# Patient Record
Sex: FEMALE | Race: NATIVE HAWAIIAN OR OTHER PACIFIC ISLANDER | Employment: FULL TIME | URBAN - METROPOLITAN AREA
[De-identification: names, ages, dates, MRNs, and addresses within clinical notes are randomized per-mention and may not be internally consistent; named-entity substitution may affect disease eponyms.]

---

## 2021-07-23 ENCOUNTER — OFFICE VISIT (OUTPATIENT)
Dept: SURGERY | Age: 26
End: 2021-07-23
Payer: OTHER GOVERNMENT

## 2021-07-23 VITALS
HEART RATE: 94 BPM | OXYGEN SATURATION: 100 % | WEIGHT: 236 LBS | RESPIRATION RATE: 18 BRPM | DIASTOLIC BLOOD PRESSURE: 90 MMHG | TEMPERATURE: 98.2 F | SYSTOLIC BLOOD PRESSURE: 136 MMHG | HEIGHT: 62 IN | BODY MASS INDEX: 43.43 KG/M2

## 2021-07-23 DIAGNOSIS — E66.01 MORBID OBESITY WITH BMI OF 40.0-44.9, ADULT (HCC): Primary | ICD-10-CM

## 2021-07-23 PROCEDURE — 99205 OFFICE O/P NEW HI 60 MIN: CPT | Performed by: SURGERY

## 2021-07-23 NOTE — PROGRESS NOTES
Chief Complaint   Patient presents with    Advice Only     confirmed video     Pt ID confirmed    Weight Loss Metrics 7/23/2021 7/23/2021 4/14/2021 1/11/2019 12/18/2018 12/17/2018 12/7/2018   Pre op / Initial Wt 236 - - - - - -   Today's Wt - 236 lb 243 lb 217 lb 217 lb 215 lb 9.6 oz 209 lb   BMI - 43.16 kg/m2 43.05 kg/m2 38.44 kg/m2 38.44 kg/m2 38.19 kg/m2 37.02 kg/m2   Ideal Body Wt 125 - - - - - -   Excess Body Wt 111 - - - - - -   Goal Wt 147 - - - - - -   Wt loss to date 0 - - - - - -   % Wt Loss 0 - - - - - -   80% EBW 88.8 - - - - - -       Body mass index is 43.16 kg/m².     Ms. Maria Isabel Daniel has been given the following recommendations today due to her elevated BP reading: pt will discuss blood pressure readings with pcp

## 2021-07-23 NOTE — PROGRESS NOTES
Consult    Patient: Dima Hendrix MRN: [id number]  SSN: [id number]    YOB: 1995  Age: 27 y.o. Sex: female      Initial  Consultation for Bariatric Surgery     Dima Hendrix is a 68-year-old white female who presents for discussion of surgical options available for definitive management of her clinically severe obesity  Onset obesity: Childhood  Weight at age 23: 250 pounds on a 5 foot 2 inch frame  Maximum weight: 250 pounds at the age of 23  Pattern/progression of weight gain: Slowly progressive interrupted by dietary weight loss followed by regain of lost weight as well as additional weight thus exhibiting yoyo effect after maximum weight of 250 pounds which occurred at the age of 23  Max medical weight loss attempts: Multiple unsupervised and supervised weight loss trials with a maximal loss occurring in 2017 losing 50 pounds over 6 months  Comorbidities: Hypertension, hypercholesterolemia, stress urinary incontinence, clinical obstructive sleep apnea and weight related arthropathy-knees  Current weight: 236 pounds on 5 foot 2 inch frame with a body mass index of 43  At the body weight: 125  Excess body weight: 111  Estimated postsurgical weight loss based on 8% loss of excess body weight: 89  Postsurgical goal weight: 47  Allergies: Amoxicillin, penicillin  Current medications: See medication list  Past medical history:  1. Clinically severe obesity with body mass index of 43 with obesity related comorbidities of hypertension, hypercholesterolemia, stress urinary incontinence, clinical obstructive sleep apnea and weight related arthropathy-knees  2. Anxiety  3. HPV  4. History of bipolar disease  Past surgical history:  1. Tonsillectomy-childhood  2. [place] tooth extractions 2017  3. Ureteral stents 2019  Social history: Denies utilization for tobacco and alcohol  Family history:   Mother  52 status post myocardial infarction, SLE, RA, hypothyroidism, hypercholesterolemia  Father  30s-unknown  etiology  No siblings    Allergies   Allergen Reactions    Amoxicillin Rash    Penicillins Rash       No current outpatient medications on file prior to visit. No current facility-administered medications on file prior to visit. Past Medical History:   Diagnosis Date    HTN (hypertension)     Hydronephrosis, right 2018    First trimester obstetrical imaging incidentally found a right ureterocele and dilation of distal right ureter.  Maternal obesity syndrome in first trimester     Pregnant     Ureterocele 2018    First trimester obstetrical imaging incidentally found a right ureterocele and dilation of distal right ureter. Past Surgical History:   Procedure Laterality Date    HX TONSIL AND ADENOIDECTOMY      HX UROLOGICAL      stent placed in ureter       Social History     Tobacco Use    Smoking status: Never Smoker    Smokeless tobacco: Never Used   Substance Use Topics    Alcohol use: Yes     Comment: occasional 1 or 2 a month    Drug use: No       Family History   Problem Relation Age of Onset    Diabetes Maternal Grandmother     Diabetes Paternal Grandmother     Cancer Maternal Grandfather     Heart Disease Mother     Hypertension Mother     Lupus Mother     Thyroid Disease Mother     Kidney Disease Mother          Review of Systems:      General: Denies fevers, chills, night sweats, fatigue, weight loss, or weight gain.     HEENT: Denies changes in auditory or visual acuity, recurrent pharyngitis, epistaxis, chronic rhinorrhea, vertigo    Respiratory: Denies increasing shortness of breath, productive cough, hemoptysis    Cardiac: Denies known history of cardiac disease, heart murmur, palpitations    GI: Denies dysphagia, recurrent emesis, hematemesis, changes in bowel habits, hematochezia, melena    : Denies hematuria frequency urgency dysuria    Musculoskeletal: Denies fractures, dislocations    Neurologic: Denies history of CVA, paralysis paresthesias, recurrent cephalgia, seizures    Endocrine: Denies polyuria, polydipsia, polyphagia, heat and cold intolerance    Lymph/heme: Denies a history of malignancy, anemia, bruising, blood transfusions    Integumentary: Negative for dermatitis         Physical Exam    Visit Vitals  BP (!) 136/90   Pulse 94   Temp 98.2 °F (36.8 °C)   Resp 18   Ht 5' 2\" (1.575 m)   Wt 107 kg (236 lb)   SpO2 100%   BMI 43.16 kg/m²       Nursing note reviewed. General: Clinically severely obese in no acute distress, nontoxic in appearance. Head: Normocephalic, atraumatic  Mouth: Clear, no overt lesions, oral mucosa is pink and moist.  Neck: Supple, no masses, no adenopathy or carotid bruits, trachea midline  Resp: Clear to auscultation bilaterally, no wheezing, rhonchi, or rales, excursions normal and symmetrical.  Cardio: Regular rate and rhythm, no murmurs, clicks, gallops, or rubs. Abdomen: Obese, soft, nontender, nondistended, normoactive bowel sounds, no hernias. Extremities: Warm, well perfused, no tenderness or swelling, normal gait/station, without edema or varicosities  Neuro: Sensation and strength grossly intact and symmetrical.  Psych: Alert and oriented to person, place, and time. Impression/Plan:    20-year-old white female with a body mass index of 43 with obesity and comorbidities of hypertension, hypercholesterolemia, stress urinary incontinence, clinical obstructive sleep apnea related arthropathy of her knees who would benefit from bariatric surgery. We have had an extensive discussion with regard to the risks, benefits and likely outcomes of the operation. We've discussed the restrictive and malabsorptive nature of the gastric bypass and compared and contrasted with the sleeve gastrectomy. The patient understands the likelihood of losing approximately 80% of their excess weight in 12 to 18 months.   The patient also understands the risks including but not limited to bleeding, infection, need for reoperation, ulcers, leaks and strictures, bowel obstruction secondary to adhesions and internal hernias, DVT, PE, heart attack, stroke, and death. Patient also understands risks of inadequate weight loss, excess weight loss, vitamin insufficiency, protein malnutrition, excess skin, and loss of hair. We have reviewed the components of a successful postoperative course including requirement for a high protein, low carbohydrate diet, 60 oz a day of zero calorie liquids, daily vitamin supplementation, daily exercise, regular follow-up, and participation in support groups.  At this time we will enroll the patient in our bariatric program, undertake routine laboratory evaluation, chest X-ray, EKG, possible UGI and evaluation by  nutritionist as well as psychologist and pending their satisfactory completion of the preop evaluation, plan to pursue laparoscopic potentially open gastric bypass to achieve definitive durable weight loss on a personal level with expected resolution of obesity related comorbidities

## 2021-08-06 ENCOUNTER — HOSPITAL ENCOUNTER (OUTPATIENT)
Dept: BARIATRICS/WEIGHT MGMT | Age: 26
Discharge: HOME | End: 2021-08-06

## 2021-08-06 ENCOUNTER — DOCUMENTATION ONLY (OUTPATIENT)
Dept: BARIATRICS/WEIGHT MGMT | Age: 26
End: 2021-08-06

## 2021-08-06 NOTE — PROGRESS NOTES
[address] [place] [address] [facility], [address]    Patient's Name: Charly Montero   Age: 27 y.o. YOB: 1995   Sex: female    Date:   8/6/2021    Insurance:            Session: 1 of  3  Revision:   Surgeon:  Dr Selena Samuel    Height: 5 f 2 Weight:    236      Lbs. BMI:    Pounds Lost since last month: 0               Pounds Gained since last month: 0      Starting Weight: 236   Previous Months Weight: 236  Overall Pounds Lost: 0 Overall Pounds Gained: 0      Do you smoke? None    Alcohol intake:  Number of drinks at a time:  2 drinks a month  Number of times a week:     Class Guidelines    Guidelines are reviewed with patient at the start of every class. 1. Patient understands that weight loss trial classes must be consecutive. Patient understands if they miss a class, it is their responsibility to contact me to reschedule class. I will reach out to patient after their first no show. 2.  Patient understands the expectations that weight maintenance/weight loss is expected during the classes. Failure to demonstrate changes may result in one extra month of weight loss trial, followed by going back to see the surgeon. Patient understands that they CAN NOT gain any weight during the weight loss trial.  Gaining weight will result in extra classes. 3. Patient is also instructed to be doing their labs, blood work, psych visit, support group and any other test that the surgeon has used while they are working on their weight loss trial.  4.  Patient was instructed to bring their blue binder to every class and appointment. Other Pertinent Information:     Changes Made Since Last Class: No eating after 7 pm.  Eating at the dining room table only    Eating Habits and Behaviors    Today in class, we reviewed the key diet principles. I have talked to patient about pushing the fluid and working towards 64 ounces per day.   We focused on following a low-calorie diet. Patient was instructed to count their carbohydrates and try to keep their daily intake under 75 grams per day and try to keep their daily protein at 80 grams per day. Patient was given examples of carbohydrates in starches. Patient was encouraged to focus on meat and vegetables and begin cutting carbohydrates out. We talked about foods that are protein-based and how to incorporate those into their meals. I also reviewed with patient the importance of eating 3 meals per day and suggestions were made for breakfast items. Patient's current diet habits include: 3 meals per day. She is eating eggs and cheese omelets. Lunch is grilled chicken salad. Dinner was a pork chop and side salad. She is snacking on cheese, pickles, nuts. She is eating out 1 x a week. Patient is drinking 64 ounces of water per day. No soda unless she has a migraine. Physical Activity/Exercise    Comments: We talked about exercise. Patient was given reasons of why exercise is so important and how that can help with their long-term success. I have encouraged patient to get a support system to help with the activity. Currently for activity, patient is doing the Take5 for 30 minutes, 3 x a week. Behavior Modification       Comments:  During today's lesson, I gave a presentation called The 100-200 Calorie \"Mindless Margin. \"  The goal is to make modest daily 100-200 calorie reductions in certain things that the body won't notice. One, 100-200 calorie change and would will look 10-20 pounds in one year. An example could be cutting soda. Patient was given a check off list and was encouraged to come up with 1-3 100 calories changes they could make. The check off list is a daily tracker to see if these goals are being met. Goals that patient wants to work on includes:  1. Incorporate protein drinks  2. Cut back on snacks. 3. Increase exercise.       Anusha Marques MS JEREMY  8/6/2021

## 2021-09-03 ENCOUNTER — DOCUMENTATION ONLY (OUTPATIENT)
Dept: BARIATRICS/WEIGHT MGMT | Age: 26
End: 2021-09-03

## 2021-09-03 ENCOUNTER — HOSPITAL ENCOUNTER (OUTPATIENT)
Dept: BARIATRICS/WEIGHT MGMT | Age: 26
Discharge: HOME | End: 2021-09-03

## 2021-09-03 NOTE — PROGRESS NOTES
[address] [facility], [address]    Patient's Name: Alison Aggarwal   Age: 27 y.o. YOB: 1995   Sex: female    Date:   9/3/2021    Insurance:              Session: 2 of  3  Surgeon:  Dr. Era Hatchet    Height: 5 f 2   Weight:    236      Lbs. BMI:    Pounds Lost since last month: 0                Pounds Gained since last month: 0    Starting Weight: 236     Previous Months Weight: 236  Overall Pounds Lost: 0   Overall Pounds Gained: 0    Smoking:  None    Alcohol intake:  Number of drinks at a time:  1  Number of times a week: 1    Class Guidelines    Guidelines are reviewed with patient at the start of every class. 1. Patient understands that weight loss trial classes must be consecutive. Patient understands if they miss a class, it is their responsibility to contact me to reschedule class. I will reach out to patient after their first no show. 2.  Patient understands the expectations that weight maintenance/weight loss is expected during the classes. Failure to demonstrate changes may result in one extra month of weight loss trial, followed by going back to see the surgeon. 3. Patient is also instructed to be doing their labs, blood work, psych visit, support group and any other test that the surgeon has used while they are working on their weight loss trial.    Other Pertinent Information:     Changes Made Since Last Class:     Eating Habits and Behaviors      During today's class, we continued to focus on the key diet principles. Patient was instructed to follow a low carbohydrate diet, focusing on meat and vegetables. Patient was instructed to stop liquid calories and aim for 64 ounces of water per day.  We focused on focusing in on bigger problem areas to start making changes to, such as reducing fast food intake, reducing carbonated beverages/soda intake, decreasing carbohydrates intake daily, etc. We reviewed protein shakes and high protein yogurts to chose, as well. During today's class, we also talked about how to read a label. Patient was given information on:  1. The benefits of reading a label, which allowed one to compare the nutritional value of similar products and make healthy food decisions. 2. The ingredient list, which can help to determine if the food is heathy or something that fits into the diet. 3. The importance of reading the serving size and making sure to apply that to the portion size that they are consuming. Patient was also educated on carbohydrates. I talked to patient about:  1. The function of carbohydrates. 2. Foods that carbohydrate-heavy. 3. Patient was given the guidelines to keep their carbohydrates less than 75 grams per day in the pre-op phase. 4. Patient was also given ideas of low carb swaps, which include zucchini noodles, spaghetti squash, or cauliflower rice. 5. Lower carbohydrate fruit options were discussed. 6. Discussed lower carb swaps to use instead of potato chips. Patient's dietary habits include: Patient is eating 3 meal per day. Meals are made up of Premier protein shake, taco salad, grilled chicken, yogurt, boiled eggs. Portions are:  [place]er size. Patient is eating out: 1 x a month. Patient is snacking on fruit, boiled eggs, protein shake. I have talked to patient about some lower carbohydrate snack choices that focused more protein. Patient is drinking 96 ounces of fluid per day. Fluid intake is make up of: water only. Physical Activity/Exercise     Comments:     Currently for exercise, patient cardio 30 minutes, 4 x a week. I have talked to patient about some suggestions to start an exercise routine. Patient is encouraged to purchase a pedometer and use this to track her steps. I have made some suggestions to patient of ways to incorporate exercise in with a busy lifestyle.   We also talked about You Tube videos that can be used for an exercise routine. Behavior Modification  Comments:  Behavior modifications were discussed with the patient. Some of those behavior modifications include:  1. Emphasized the importance of eating slowly, not eating and drinking meals at the same time. 2.  Taking 20-30 minutes to eat a meal  3. I have encouraged patient to follow journal, which may be done by paper or tracking it an lauren, such as My Fitness Pal or [employer]. [address]. Patient understands the importance of following through with these behaviors following surgery to aid in long term weight loss. Tips and advice were given on how to start implementing these into the patient's life. Patient has attended the required bariatric support group. Goal patient has set for next month:  1. Cut out caffeine.   2.  Working on picking foods to stay food longer    Basilia Early. Aram Moise 112  9/3/2021

## 2021-10-01 ENCOUNTER — DOCUMENTATION ONLY (OUTPATIENT)
Dept: BARIATRICS/WEIGHT MGMT | Age: 26
End: 2021-10-01

## 2021-10-01 NOTE — PROGRESS NOTES
10/1/2021:  Patient did not show for her nutrition visit. She did not complete the nutrition requirements that were sent on 3 separate occasions. She was left a voicemail to contact me if she is still interested in surgery.     Jamal Thomas MS RD